# Patient Record
(demographics unavailable — no encounter records)

---

## 2025-03-26 NOTE — PHYSICAL EXAM

## 2025-03-26 NOTE — DISCUSSION/SUMMARY
[FreeTextEntry1] : 1.  98: Will obtain blood work and iron, denies any results are known.  EKG no cardiac 2.  Postauricular adenopathy: refer for biopsy [EKG obtained to assist in diagnosis and management of assessed problem(s)] : EKG obtained to assist in diagnosis and management of assessed problem(s)

## 2025-03-26 NOTE — HISTORY OF PRESENT ILLNESS
[FreeTextEntry1] : 40-year-old female with past medical history of iron deficiency anemia comes in for follow-up.  Overall, she feels well no chest pain orthopnea.